# Patient Record
Sex: MALE | Race: WHITE | URBAN - METROPOLITAN AREA
[De-identification: names, ages, dates, MRNs, and addresses within clinical notes are randomized per-mention and may not be internally consistent; named-entity substitution may affect disease eponyms.]

---

## 2020-02-25 ENCOUNTER — OFFICE VISIT (OUTPATIENT)
Dept: URBAN - METROPOLITAN AREA CLINIC 75 | Facility: CLINIC | Age: 55
End: 2020-02-25
Payer: COMMERCIAL

## 2020-02-25 DIAGNOSIS — G47.30 SLEEP APNEA: ICD-10-CM

## 2020-02-25 DIAGNOSIS — H43.823 VITREOMACULAR ADHESION, BILATERAL: ICD-10-CM

## 2020-02-25 DIAGNOSIS — H40.013 OPEN ANGLE WITH BORDERLINE FINDINGS, LOW RISK, BILATERAL: ICD-10-CM

## 2020-02-25 DIAGNOSIS — H25.13 AGE-RELATED NUCLEAR CATARACT, BILATERAL: ICD-10-CM

## 2020-02-25 DIAGNOSIS — H35.413 LATTICE DEGENERATION OF RETINA, BILATERAL: ICD-10-CM

## 2020-02-25 PROCEDURE — 92133 CPTRZD OPH DX IMG PST SGM ON: CPT | Performed by: OPTOMETRIST

## 2020-02-25 PROCEDURE — 92014 COMPRE OPH EXAM EST PT 1/>: CPT | Performed by: OPTOMETRIST

## 2020-02-25 PROCEDURE — 92134 CPTRZ OPH DX IMG PST SGM RTA: CPT | Performed by: OPTOMETRIST

## 2020-02-25 ASSESSMENT — INTRAOCULAR PRESSURE
OD: 8
OS: 8

## 2020-02-25 NOTE — IMPRESSION/PLAN
Impression: Sleep apnea: G47.30.  Plan: Due to hypoxic look of optic nerves and papilledema, recommend pt get a sleep study through PCP

## 2020-02-25 NOTE — IMPRESSION/PLAN
Impression: Open angle with borderline findings, low risk, bilateral: H40.013. Plan: Due to RNFL and ganglion cell layer thinning, and asymmetrical optic nerves.

## 2020-02-25 NOTE — IMPRESSION/PLAN
Impression: Lattice degeneration of retina, bilateral: H35.413. Plan: Discussed diagnosis in detail with patient. Will continue to observe condition and or symptoms.

## 2020-02-25 NOTE — IMPRESSION/PLAN
Impression: Drusen (degenerative) of macula, bilateral: H35.363. Plan: Discussed diagnosis in detail with patient. Will continue to observe condition and or symptoms.   Will observe on a yearly basis

## 2021-02-27 ENCOUNTER — OFFICE VISIT (OUTPATIENT)
Dept: URBAN - METROPOLITAN AREA CLINIC 75 | Facility: CLINIC | Age: 56
End: 2021-02-27
Payer: COMMERCIAL

## 2021-02-27 DIAGNOSIS — H35.363 DRUSEN (DEGENERATIVE) OF MACULA, BILATERAL: Primary | ICD-10-CM

## 2021-02-27 DIAGNOSIS — H35.033 HYPERTENSIVE RETINOPATHY, BILATERAL: ICD-10-CM

## 2021-02-27 DIAGNOSIS — E11.9 TYPE 2 DIABETES MELLITUS W/O COMPLICATION: ICD-10-CM

## 2021-02-27 PROCEDURE — 92134 CPTRZ OPH DX IMG PST SGM RTA: CPT | Performed by: OPTOMETRIST

## 2021-02-27 PROCEDURE — 92133 CPTRZD OPH DX IMG PST SGM ON: CPT | Performed by: OPTOMETRIST

## 2021-02-27 PROCEDURE — 92014 COMPRE OPH EXAM EST PT 1/>: CPT | Performed by: OPTOMETRIST

## 2021-02-27 ASSESSMENT — INTRAOCULAR PRESSURE
OD: 9
OS: 10

## 2021-02-27 NOTE — IMPRESSION/PLAN
Impression: Open angle with borderline findings, low risk, bilateral: H40.013- OCT preformed today reals thinning on RNFL most likely optic nerve drusen. Plan: No treatment needed at this time. Will continue to observe.

## 2021-02-27 NOTE — IMPRESSION/PLAN
Impression: Type 2 diabetes mellitus w/o complication: C38.7-  no background retinopathy, no signs of neovascularization noted. Plan:  Discussed ocular and systemic benefits of blood sugar control.

## 2021-02-27 NOTE — IMPRESSION/PLAN
Impression: Drusen (degenerative) of macula, bilateral: H35.363. Plan: There are no specific modalities proven to be effective to treat drusen. A diet rich in green vegetables is recommended. It is important to follow the amsler grid and report any changes such as increase in distortion or blurring of lines. Avoidance of smoking or smoking cessation is recommended. Contact office if you experience decrease in vision, blurred vision, distortion in vision or concerning changes in vision.

## 2022-06-07 ENCOUNTER — OFFICE VISIT (OUTPATIENT)
Dept: URBAN - METROPOLITAN AREA CLINIC 71 | Facility: CLINIC | Age: 57
End: 2022-06-07
Payer: COMMERCIAL

## 2022-06-07 DIAGNOSIS — H35.033 HYPERTENSIVE RETINOPATHY, BILATERAL: ICD-10-CM

## 2022-06-07 DIAGNOSIS — H43.813 VITREOUS DEGENERATION, BILATERAL: ICD-10-CM

## 2022-06-07 DIAGNOSIS — H25.13 AGE-RELATED NUCLEAR CATARACT, BILATERAL: ICD-10-CM

## 2022-06-07 DIAGNOSIS — H40.013 OPEN ANGLE WITH BORDERLINE FINDINGS, LOW RISK, BILATERAL: Primary | ICD-10-CM

## 2022-06-07 DIAGNOSIS — E11.9 TYPE 2 DIABETES MELLITUS W/O COMPLICATION: ICD-10-CM

## 2022-06-07 DIAGNOSIS — H35.363 DRUSEN (DEGENERATIVE) OF MACULA, BILATERAL: ICD-10-CM

## 2022-06-07 PROCEDURE — 99214 OFFICE O/P EST MOD 30 MIN: CPT

## 2022-06-07 PROCEDURE — 92134 CPTRZ OPH DX IMG PST SGM RTA: CPT

## 2022-06-07 ASSESSMENT — INTRAOCULAR PRESSURE
OD: 9
OS: 9

## 2022-06-07 NOTE — IMPRESSION/PLAN
Impression: Open angle with borderline findings, low risk, bilateral: H40.013. Plan: Educated pt on DX and possible treatment options, pt voiced understanding. Pt is not on gtt therapy.  Do not recommend gtts at this time, due to healthy ON appearance and WNL IOP  Will monitor for changes yearly

## 2022-06-07 NOTE — IMPRESSION/PLAN
Impression: Hypertensive retinopathy, bilateral: H35.033. Plan: Discussed DX in detail with patient. Educated on importance of BP control. Recommend pt to see PCP and discuss what options there are for better compliance/ lowering BP. Explained to pt that this can resolve on its own but, only with better BP control/compliance.

## 2022-06-07 NOTE — IMPRESSION/PLAN
Impression: Type 2 diabetes mellitus w/o complication: A74.1. Per patient, diabetic 6 years. Last A1C 6.6 and glucose 130 today. Plan: Patient educated on today's findings. Patient to continue monitoring blood sugar regularly, taking medications as directed and having regular follow-up appts w/ PCP. Continue to monitor w/ yearly DFE and Optos.

## 2022-06-07 NOTE — IMPRESSION/PLAN
Impression: Drusen (degenerative) of macula, bilateral: H35.363. OCT ordered and reviewed. Plan: Discussed Dx in detail with patient. Advised  avoidance of smoking and a diet rich in green vegetables is recommended. Patient to call office if experiencing a decrease in vision, blurred vision, distortion in vision or concerning changes in vision.

## 2023-02-07 ENCOUNTER — OFFICE VISIT (OUTPATIENT)
Dept: URBAN - METROPOLITAN AREA CLINIC 75 | Facility: CLINIC | Age: 58
End: 2023-02-07
Payer: COMMERCIAL

## 2023-02-07 DIAGNOSIS — H25.13 AGE-RELATED NUCLEAR CATARACT, BILATERAL: ICD-10-CM

## 2023-02-07 DIAGNOSIS — E11.9 TYPE 2 DIABETES MELLITUS W/O COMPLICATION: Primary | ICD-10-CM

## 2023-02-07 DIAGNOSIS — H43.813 VITREOUS DEGENERATION, BILATERAL: ICD-10-CM

## 2023-02-07 DIAGNOSIS — H35.363 DRUSEN (DEGENERATIVE) OF MACULA, BILATERAL: ICD-10-CM

## 2023-02-07 PROCEDURE — 99213 OFFICE O/P EST LOW 20 MIN: CPT | Performed by: OPTOMETRIST

## 2023-02-07 ASSESSMENT — INTRAOCULAR PRESSURE
OD: 8
OS: 8

## 2023-02-07 NOTE — IMPRESSION/PLAN
Impression: Drusen (degenerative) of macula, bilateral: H35.363. Plan: Clinical exam reveals evidence of mild drusen OU. Not enough to justify diagnosis of AMD at this time. Discussed drusen may be an early sign of macular degeneration and this diagnosis may be possible in future. There are no specific modalities proven to be effective to treat drusen. Avoidance of smoking or smoking cessation, a diet rich in green vegetables, and regular use of sunglasses with 100% ultraviolet light is recommended. Observe. Pt to contact office if he experiences decrease in vision, blurred vision, distortion in vision or concerning changes in vision.

## 2023-02-07 NOTE — IMPRESSION/PLAN
Impression: Age-related nuclear cataract, bilateral: H25.13. Plan: Discussed cataracts do not require treatment unless they interfere with vision and impact one's activities of daily living, in which case cataract extraction with lens implant insertion should be performed. Pt declines any difficulty w/ vision at this time and elects to observe for now. No surgical intervention recommended. Discussed signs and symptoms of cataract progression. Pt to contact office if they experience progressive loss of vision, increasing glare, and problems with activities of daily living such as driving, reading, watching TV, seeing street signs, and following the golf ball.

## 2023-02-07 NOTE — IMPRESSION/PLAN
Impression: Type 2 diabetes mellitus w/o complication: E85.2. OPTOS ordered and performed revealing neg DME Plan: Diabetes type II: no background retinopathy, no signs of neovascularization noted. Discussed ocular and systemic benefits of blood sugar control.

## 2024-06-24 ENCOUNTER — OFFICE VISIT (OUTPATIENT)
Dept: URBAN - METROPOLITAN AREA CLINIC 75 | Facility: CLINIC | Age: 59
End: 2024-06-24
Payer: COMMERCIAL

## 2024-06-24 DIAGNOSIS — E11.9 TYPE 2 DIABETES MELLITUS W/O COMPLICATION: Primary | ICD-10-CM

## 2024-06-24 DIAGNOSIS — H35.3131 NONEXUDATIVE MACULAR DEGENERATION, EARLY DRY STAGE, BILATERAL: ICD-10-CM

## 2024-06-24 DIAGNOSIS — H35.033 HYPERTENSIVE RETINOPATHY, BILATERAL: ICD-10-CM

## 2024-06-24 PROCEDURE — 99213 OFFICE O/P EST LOW 20 MIN: CPT | Performed by: OPTOMETRIST

## 2024-06-24 ASSESSMENT — INTRAOCULAR PRESSURE
OS: 7
OD: 7

## 2025-06-06 ENCOUNTER — OFFICE VISIT (OUTPATIENT)
Dept: URBAN - METROPOLITAN AREA CLINIC 75 | Facility: CLINIC | Age: 60
End: 2025-06-06
Payer: COMMERCIAL

## 2025-06-06 DIAGNOSIS — H25.813 COMBINED FORMS OF AGE-RELATED CATARACT, BILATERAL: ICD-10-CM

## 2025-06-06 DIAGNOSIS — H35.3131 NONEXUDATIVE MACULAR DEGENERATION, EARLY DRY STAGE, BILATERAL: ICD-10-CM

## 2025-06-06 DIAGNOSIS — H43.813 VITREOUS DEGENERATION, BILATERAL: ICD-10-CM

## 2025-06-06 DIAGNOSIS — E11.9 TYPE 2 DIABETES MELLITUS W/O COMPLICATION: Primary | ICD-10-CM

## 2025-06-06 PROCEDURE — 99213 OFFICE O/P EST LOW 20 MIN: CPT | Performed by: OPTOMETRIST

## 2025-06-06 ASSESSMENT — INTRAOCULAR PRESSURE
OS: 10
OD: 9